# Patient Record
Sex: FEMALE | Race: WHITE | Employment: STUDENT | URBAN - METROPOLITAN AREA
[De-identification: names, ages, dates, MRNs, and addresses within clinical notes are randomized per-mention and may not be internally consistent; named-entity substitution may affect disease eponyms.]

---

## 2024-09-16 ENCOUNTER — HOSPITAL ENCOUNTER (OUTPATIENT)
Age: 34
Discharge: HOME | End: 2024-09-16
Payer: OTHER GOVERNMENT

## 2024-09-16 LAB
ANION GAP SERPL CALCULATED.3IONS-SCNC: 13 MMOL/L (ref 7–16)
BUN SERPL-MCNC: 10 MG/DL (ref 6–20)
CALCIUM SERPL-MCNC: 10.1 MG/DL (ref 8.6–10.2)
CHLORIDE SERPL-SCNC: 102 MMOL/L (ref 98–107)
CHOLEST SERPL-MCNC: 226 MG/DL
CO2 SERPL-SCNC: 24 MMOL/L (ref 22–29)
CREAT SERPL-MCNC: 1 MG/DL (ref 0.5–1)
ESTRADIOL LEVEL: 78.4 PG/ML
FSH SERPL-ACNC: 2.1 MIU/ML
GFR, ESTIMATED: 78 ML/MIN/1.73M2
GLUCOSE SERPL-MCNC: 93 MG/DL (ref 74–99)
HBA1C MFR BLD: 5.9 % (ref 4–5.6)
HDLC SERPL-MCNC: 34 MG/DL
LDLC SERPL CALC-MCNC: 163 MG/DL
LH SERPL-ACNC: 4.7 MIU/ML
POTASSIUM SERPL-SCNC: 4.3 MMOL/L (ref 3.5–5)
PROLACTIN SERPL-MCNC: 8.83 NG/ML
SODIUM SERPL-SCNC: 139 MMOL/L (ref 132–146)
TRIGL SERPL-MCNC: 144 MG/DL
VLDLC SERPL CALC-MCNC: 29 MG/DL

## 2024-09-16 PROCEDURE — 83001 ASSAY OF GONADOTROPIN (FSH): CPT

## 2024-09-16 PROCEDURE — 80061 LIPID PANEL: CPT

## 2024-09-16 PROCEDURE — 82670 ASSAY OF TOTAL ESTRADIOL: CPT

## 2024-09-16 PROCEDURE — 83002 ASSAY OF GONADOTROPIN (LH): CPT

## 2024-09-16 PROCEDURE — 80048 BASIC METABOLIC PNL TOTAL CA: CPT

## 2024-09-16 PROCEDURE — 36415 COLL VENOUS BLD VENIPUNCTURE: CPT

## 2024-09-16 PROCEDURE — 83036 HEMOGLOBIN GLYCOSYLATED A1C: CPT

## 2024-09-16 PROCEDURE — 84146 ASSAY OF PROLACTIN: CPT

## 2025-02-04 ENCOUNTER — OFFICE VISIT (OUTPATIENT)
Dept: FAMILY MEDICINE CLINIC | Age: 35
End: 2025-02-04
Payer: OTHER GOVERNMENT

## 2025-02-04 VITALS
DIASTOLIC BLOOD PRESSURE: 80 MMHG | BODY MASS INDEX: 43.71 KG/M2 | HEIGHT: 64 IN | HEART RATE: 92 BPM | TEMPERATURE: 97.8 F | SYSTOLIC BLOOD PRESSURE: 138 MMHG | OXYGEN SATURATION: 97 % | WEIGHT: 256 LBS

## 2025-02-04 DIAGNOSIS — R73.03 PRE-DIABETES: ICD-10-CM

## 2025-02-04 DIAGNOSIS — E78.5 HYPERLIPIDEMIA, UNSPECIFIED HYPERLIPIDEMIA TYPE: Primary | ICD-10-CM

## 2025-02-04 PROCEDURE — 99204 OFFICE O/P NEW MOD 45 MIN: CPT | Performed by: STUDENT IN AN ORGANIZED HEALTH CARE EDUCATION/TRAINING PROGRAM

## 2025-02-04 RX ORDER — SPIRONOLACTONE 100 MG/1
300 TABLET, FILM COATED ORAL DAILY
COMMUNITY

## 2025-02-04 RX ORDER — ATORVASTATIN CALCIUM 10 MG/1
10 TABLET, FILM COATED ORAL DAILY
Qty: 30 TABLET | Refills: 3 | Status: SHIPPED | OUTPATIENT
Start: 2025-02-04

## 2025-02-04 RX ORDER — ESTRADIOL 1 MG/1
3 TABLET ORAL DAILY
COMMUNITY

## 2025-02-04 SDOH — ECONOMIC STABILITY: FOOD INSECURITY: WITHIN THE PAST 12 MONTHS, THE FOOD YOU BOUGHT JUST DIDN'T LAST AND YOU DIDN'T HAVE MONEY TO GET MORE.: NEVER TRUE

## 2025-02-04 SDOH — ECONOMIC STABILITY: FOOD INSECURITY: WITHIN THE PAST 12 MONTHS, YOU WORRIED THAT YOUR FOOD WOULD RUN OUT BEFORE YOU GOT MONEY TO BUY MORE.: NEVER TRUE

## 2025-02-04 SDOH — HEALTH STABILITY: PHYSICAL HEALTH
ON AVERAGE, HOW MANY DAYS PER WEEK DO YOU ENGAGE IN MODERATE TO STRENUOUS EXERCISE (LIKE A BRISK WALK)?: PATIENT DECLINED

## 2025-02-04 ASSESSMENT — ENCOUNTER SYMPTOMS
NAUSEA: 0
ABDOMINAL PAIN: 0
COUGH: 0
SHORTNESS OF BREATH: 0
RHINORRHEA: 0
VOMITING: 0

## 2025-02-04 ASSESSMENT — PATIENT HEALTH QUESTIONNAIRE - PHQ9
SUM OF ALL RESPONSES TO PHQ9 QUESTIONS 1 & 2: 2
SUM OF ALL RESPONSES TO PHQ QUESTIONS 1-9: 2
1. LITTLE INTEREST OR PLEASURE IN DOING THINGS: SEVERAL DAYS
2. FEELING DOWN, DEPRESSED OR HOPELESS: SEVERAL DAYS
SUM OF ALL RESPONSES TO PHQ QUESTIONS 1-9: 2

## 2025-02-04 NOTE — PROGRESS NOTES
[facility]  Office Progress Note  Dr. Gary Stauffer      Patient:  Traci Dickens 34 y.o. female     Date of Service: 2/4/25      Chief complaint:   Chief Complaint   Patient presents with    Establish Care     Asking for basic labs         History of Present Illness   The patient is a 34 y.o. female  here to follow up of their establish, pre DM and HLD    History of Present Illness        Patient would like to establish with a new PCP  Reports a hx of hld and pre DM  Practitioner rx her hormones recommended establishing  Currently not on any medication for this  Unaware of family hx  Denies chest pain, shortness of breath, leg swelling, headache, vision changes and orthopnea  Recently has been trying to make appropriate dietary changes. Does cardiovascular exercise frequently    BP Readings from Last 3 Encounters:   02/04/25 138/80         Past Medical History:  No past medical history on file.    Review of Systems:   Review of Systems   Constitutional:  Negative for chills and fever.   HENT:  Negative for congestion and rhinorrhea.    Respiratory:  Negative for cough and shortness of breath.    Cardiovascular:  Negative for chest pain and leg swelling.   Gastrointestinal:  Negative for abdominal pain, nausea and vomiting.   Genitourinary:  Negative for dysuria and hematuria.   Musculoskeletal:  Negative for arthralgias and myalgias.   Skin:  Negative for rash and wound.   Neurological:  Negative for dizziness and light-headedness.       Physical Exam   Vitals: /80   Pulse 92   Temp 97.8 °F (36.6 °C)   Ht 1.626 m (5' 4\")   Wt 116.1 kg (256 lb)   SpO2 97%   BMI 43.94 kg/m²   Physical Exam    Physical Exam      General:  Well developed, well nourished, well groomed.  No apparent distress.  HEENT:  Normocephalic, atraumatic.  No scleral icterus.  No conjunctival injection. No nasal discharge.  Heart:  RRR, no murmurs, gallops, or rubs  Lungs:  CTA bilaterally, bilat symmetrical expansion,

## 2025-04-28 DIAGNOSIS — R73.03 PRE-DIABETES: ICD-10-CM

## 2025-04-28 DIAGNOSIS — E78.5 HYPERLIPIDEMIA, UNSPECIFIED HYPERLIPIDEMIA TYPE: ICD-10-CM

## 2025-04-28 LAB
ALBUMIN: 4.2 G/DL (ref 3.5–5.2)
ALP BLD-CCNC: 62 U/L (ref 35–104)
ALT SERPL-CCNC: 16 U/L (ref 0–35)
ANION GAP SERPL CALCULATED.3IONS-SCNC: 9 MMOL/L (ref 7–16)
AST SERPL-CCNC: 20 U/L (ref 0–35)
BASOPHILS ABSOLUTE: 0.05 K/UL (ref 0–0.2)
BASOPHILS RELATIVE PERCENT: 1 % (ref 0–2)
BILIRUB SERPL-MCNC: 0.3 MG/DL (ref 0–1.2)
BUN BLDV-MCNC: 7 MG/DL (ref 6–20)
CALCIUM SERPL-MCNC: 9.3 MG/DL (ref 8.6–10)
CHLORIDE BLD-SCNC: 102 MMOL/L (ref 98–107)
CHOLESTEROL, TOTAL: 190 MG/DL
CO2: 23 MMOL/L (ref 22–29)
CREAT SERPL-MCNC: 0.8 MG/DL (ref 0.5–1)
EOSINOPHILS ABSOLUTE: 0.06 K/UL (ref 0.05–0.5)
EOSINOPHILS RELATIVE PERCENT: 1 % (ref 0–6)
GFR, ESTIMATED: >90 ML/MIN/1.73M2
GLUCOSE BLD-MCNC: 105 MG/DL (ref 74–99)
HBA1C MFR BLD: 5.8 % (ref 4–5.6)
HCT VFR BLD CALC: 42.9 % (ref 34–48)
HDLC SERPL-MCNC: 35 MG/DL
HEMOGLOBIN: 13.7 G/DL (ref 11.5–15.5)
IMMATURE GRANULOCYTES %: 0 % (ref 0–5)
IMMATURE GRANULOCYTES ABSOLUTE: 0.03 K/UL (ref 0–0.58)
LDL CHOLESTEROL: 134 MG/DL
LYMPHOCYTES ABSOLUTE: 1.78 K/UL (ref 1.5–4)
LYMPHOCYTES RELATIVE PERCENT: 22 % (ref 20–42)
MCH RBC QN AUTO: 30.2 PG (ref 26–35)
MCHC RBC AUTO-ENTMCNC: 31.9 G/DL (ref 32–34.5)
MCV RBC AUTO: 94.5 FL (ref 80–99.9)
MONOCYTES ABSOLUTE: 0.68 K/UL (ref 0.1–0.95)
MONOCYTES RELATIVE PERCENT: 8 % (ref 2–12)
NEUTROPHILS ABSOLUTE: 5.54 K/UL (ref 1.8–7.3)
NEUTROPHILS RELATIVE PERCENT: 68 % (ref 43–80)
PDW BLD-RTO: 12.4 % (ref 11.5–15)
PLATELET # BLD: 413 K/UL (ref 130–450)
PMV BLD AUTO: 10.3 FL (ref 7–12)
POTASSIUM SERPL-SCNC: 4.4 MMOL/L (ref 3.5–5.1)
RBC # BLD: 4.54 M/UL (ref 3.5–5.5)
SODIUM BLD-SCNC: 134 MMOL/L (ref 136–145)
TOTAL PROTEIN: 7 G/DL (ref 6.4–8.3)
TRIGL SERPL-MCNC: 108 MG/DL
VLDLC SERPL CALC-MCNC: 22 MG/DL
WBC # BLD: 8.1 K/UL (ref 4.5–11.5)

## 2025-04-29 ENCOUNTER — RESULTS FOLLOW-UP (OUTPATIENT)
Dept: FAMILY MEDICINE CLINIC | Age: 35
End: 2025-04-29

## 2025-05-28 ENCOUNTER — OFFICE VISIT (OUTPATIENT)
Dept: FAMILY MEDICINE CLINIC | Age: 35
End: 2025-05-28
Payer: OTHER GOVERNMENT

## 2025-05-28 VITALS
BODY MASS INDEX: 45.32 KG/M2 | TEMPERATURE: 97.4 F | OXYGEN SATURATION: 99 % | SYSTOLIC BLOOD PRESSURE: 120 MMHG | HEART RATE: 94 BPM | WEIGHT: 264 LBS | DIASTOLIC BLOOD PRESSURE: 72 MMHG

## 2025-05-28 DIAGNOSIS — F41.9 ANXIETY AND DEPRESSION: Primary | ICD-10-CM

## 2025-05-28 DIAGNOSIS — E66.813 CLASS 3 SEVERE OBESITY DUE TO EXCESS CALORIES WITHOUT SERIOUS COMORBIDITY WITH BODY MASS INDEX (BMI) OF 45.0 TO 49.9 IN ADULT (HCC): ICD-10-CM

## 2025-05-28 DIAGNOSIS — R73.03 PRE-DIABETES: ICD-10-CM

## 2025-05-28 DIAGNOSIS — F32.A ANXIETY AND DEPRESSION: Primary | ICD-10-CM

## 2025-05-28 PROCEDURE — 99214 OFFICE O/P EST MOD 30 MIN: CPT | Performed by: STUDENT IN AN ORGANIZED HEALTH CARE EDUCATION/TRAINING PROGRAM

## 2025-05-28 RX ORDER — BUPROPION HYDROCHLORIDE 150 MG/1
150 TABLET ORAL EVERY MORNING
Qty: 30 TABLET | Refills: 1 | Status: SHIPPED | OUTPATIENT
Start: 2025-05-28

## 2025-05-28 ASSESSMENT — ENCOUNTER SYMPTOMS
RHINORRHEA: 0
ABDOMINAL PAIN: 0
NAUSEA: 0
SHORTNESS OF BREATH: 0
VOMITING: 0
COUGH: 0

## 2025-05-28 NOTE — PROGRESS NOTES
[facility]  Office Progress Note  Dr. Gary Stauffer      Patient:  Traci Dicekns 34 y.o. female     Date of Service: 5/28/25      Chief complaint:   Chief Complaint   Patient presents with    Anxiety    Depression    Weight Loss     Has made exercise and diet modification         History of Present Illness   The patient is a 34 y.o. female  here to follow up of their depression, anxiety, pre DM    History of Present Illness     Depression/anxiety  -work increasing anxiety  -not wanting to do anything, hard time getting the motivation to go anywhere other than work  -causign some issues with personal relationships  -has not taken anything in the past  -no SI/HI  -is open to counseling and or medication    Pre DM/obesity  -slight dip in A1c  -interested in weight loss medication  -currently  not on anything for pre DM  -lipids improved on statin  -has been unsuccessful with lifestyle modifications  Wt Readings from Last 3 Encounters:   05/28/25 119.7 kg (264 lb)   02/04/25 116.1 kg (256 lb)     Hemoglobin A1C   Date Value Ref Range Status   04/28/2025 5.8 (H) 4.0 - 5.6 % Final            Past Medical History:  No past medical history on file.    Review of Systems:   Review of Systems   Constitutional:  Negative for chills and fever.   HENT:  Negative for congestion and rhinorrhea.    Respiratory:  Negative for cough and shortness of breath.    Cardiovascular:  Negative for chest pain and leg swelling.   Gastrointestinal:  Negative for abdominal pain, nausea and vomiting.   Genitourinary:  Negative for dysuria and hematuria.   Musculoskeletal:  Negative for arthralgias and myalgias.   Skin:  Negative for rash and wound.   Neurological:  Negative for dizziness and light-headedness.   Psychiatric/Behavioral:  Positive for dysphoric mood and sleep disturbance. Negative for self-injury and suicidal ideas. The patient is nervous/anxious.        Physical Exam   Vitals: /72   Pulse 94   Temp 97.4 °F

## 2025-05-28 NOTE — PROGRESS NOTES
[facility]  Office Progress Note  Dr. Gary Stauffer      Patient:  Traci Dickens 34 y.o. female     Date of Service: 5/28/25      Chief complaint:   Chief Complaint   Patient presents with   • Anxiety   • Depression   • Weight Loss     Has made exercise and diet modification         History of Present Illness   The patient is a 34 y.o. female  here to follow up of their ***    History of Present Illness            Past Medical History:  No past medical history on file.    Review of Systems:   Review of Systems    Physical Exam   Vitals: /72   Pulse 94   Temp 97.4 °F (36.3 °C)   Wt 119.7 kg (264 lb)   SpO2 99%   BMI 45.32 kg/m²   Physical Exam    Physical Exam      General:  Well developed, well nourished, well groomed.  No apparent distress.  HEENT:  Normocephalic, atraumatic.  No scleral icterus.  No conjunctival injection. No nasal discharge.  Heart:  RRR, no murmurs, gallops, or rubs  Lungs:  CTA bilaterally, bilat symmetrical expansion, no wheeze, rales, or rhonchi  Abdomen:  Soft, nontender, no masses, no organomegaly, no peritoneal signs  Extremities:  No clubbing, cyanosis, or edema  Neuro:  Alert and oriented x3, no focal deficits      Assessment and Plan     Assessment & Plan        There are no diagnoses linked to this encounter.     Counseled regarding above diagnosis, including possible risks and complications,  especially if left uncontrolled. Counseled regarding the possible side effects, risks, benefits and alternatives to treatment;patient and/or guardian verbalizes understanding, agrees, feels comfortable with and wishes to proceed with above treatment plan.    Call or go to ED immediately if symptoms worsen or persist. Advised patient to call with any new medication issues, and, as applicable, read all Rx info from pharmacy to assure aware of all possible risks and side effects of medicationbefore taking.     Patient and/or guardian given opportunity to ask

## 2025-06-10 ENCOUNTER — TELEPHONE (OUTPATIENT)
Dept: FAMILY MEDICINE CLINIC | Age: 35
End: 2025-06-10

## 2025-06-10 NOTE — TELEPHONE ENCOUNTER
Traci calling about the Atorvastatin & Bupropion she is taking.       One or both of these meds are causing her to become bloated, and she has no appetite.    She is trying to drink more water and juice, but this is not making much of a difference.

## 2025-06-10 NOTE — TELEPHONE ENCOUNTER
I tried to reach Traci to discuss a possible change in medication from Atorvastatin to Crestor, but she did not answer. The voicemail is full.

## 2025-06-11 NOTE — TELEPHONE ENCOUNTER
I tried to reach Traci with Dr Stauffer's recommendation for the Crestor, but she did not answer, and voicemail is full.      Dr Stauffer's response:        If not improving can try cutting in half or taking every other day. If still having issues after that OK to stop and let me know

## 2025-06-11 NOTE — TELEPHONE ENCOUNTER
If not improving can try cutting in half or taking every other day. If still having issues after that OK to stop and let me know

## 2025-06-11 NOTE — TELEPHONE ENCOUNTER
Traci called back and was advised to cut the Crestor pill in half or take it every other day.      She is agreeable to this and will call back if she has any issues with the Crestor.

## 2025-06-22 DIAGNOSIS — F32.A ANXIETY AND DEPRESSION: ICD-10-CM

## 2025-06-22 DIAGNOSIS — F41.9 ANXIETY AND DEPRESSION: ICD-10-CM

## 2025-06-24 NOTE — TELEPHONE ENCOUNTER
Last Appointment:  5/28/2025  Future Appointments   Date Time Provider Department Center   7/15/2025 12:30 PM Gary Stauffer MD [facility]

## 2025-06-25 RX ORDER — BUPROPION HYDROCHLORIDE 150 MG/1
150 TABLET ORAL EVERY MORNING
Qty: 90 TABLET | Refills: 1 | Status: SHIPPED | OUTPATIENT
Start: 2025-06-25

## 2025-07-15 ENCOUNTER — OFFICE VISIT (OUTPATIENT)
Dept: FAMILY MEDICINE CLINIC | Age: 35
End: 2025-07-15
Payer: OTHER GOVERNMENT

## 2025-07-15 VITALS
SYSTOLIC BLOOD PRESSURE: 118 MMHG | RESPIRATION RATE: 16 BRPM | OXYGEN SATURATION: 98 % | BODY MASS INDEX: 44.8 KG/M2 | TEMPERATURE: 97 F | HEIGHT: 64 IN | DIASTOLIC BLOOD PRESSURE: 80 MMHG | HEART RATE: 79 BPM | WEIGHT: 262.4 LBS

## 2025-07-15 DIAGNOSIS — F41.9 ANXIETY AND DEPRESSION: ICD-10-CM

## 2025-07-15 DIAGNOSIS — E66.813 CLASS 3 SEVERE OBESITY DUE TO EXCESS CALORIES WITHOUT SERIOUS COMORBIDITY WITH BODY MASS INDEX (BMI) OF 45.0 TO 49.9 IN ADULT (HCC): ICD-10-CM

## 2025-07-15 DIAGNOSIS — E78.5 HYPERLIPIDEMIA, UNSPECIFIED HYPERLIPIDEMIA TYPE: ICD-10-CM

## 2025-07-15 DIAGNOSIS — M25.511 RIGHT SHOULDER PAIN, UNSPECIFIED CHRONICITY: ICD-10-CM

## 2025-07-15 DIAGNOSIS — R73.03 PRE-DIABETES: Primary | ICD-10-CM

## 2025-07-15 DIAGNOSIS — F32.A ANXIETY AND DEPRESSION: ICD-10-CM

## 2025-07-15 PROCEDURE — 99214 OFFICE O/P EST MOD 30 MIN: CPT | Performed by: STUDENT IN AN ORGANIZED HEALTH CARE EDUCATION/TRAINING PROGRAM

## 2025-07-15 RX ORDER — MELOXICAM 15 MG/1
15 TABLET ORAL DAILY
Qty: 14 TABLET | Refills: 0 | Status: SHIPPED | OUTPATIENT
Start: 2025-07-15

## 2025-07-15 ASSESSMENT — ENCOUNTER SYMPTOMS
COUGH: 0
SHORTNESS OF BREATH: 0
RHINORRHEA: 0
VOMITING: 0
NAUSEA: 0
ABDOMINAL PAIN: 0

## 2025-07-15 NOTE — PROGRESS NOTES
[facility]  Office Progress Note  Dr. Gary Stauffer      Patient:  Traci Dickens 35 y.o. female     Date of Service: 7/15/25      Chief complaint:   Chief Complaint   Patient presents with    Follow-up     Not started Mounjaro yet, unsure if there is an insurnace problem. Having right shoulder pain after dog jumpbed on it. Has pain with certain movement, pain radiates into neck and down to elbow.No numbness/tingling, no swellling, no weakness.          History of Present Illness   The patient is a 35 y.o. female  here to follow up of their anxiety    History of Present Illness  The patient presents for evaluation of anxiety, shoulder pain, and bloating.    Anxiety  - She reports an improvement in her condition since her last visit in late May or early June 2025.  - She is currently on Wellbutrin  - Has not yet heard from counseling, referral was sent.   - She is considering adjusting the timing of these medications, taking the anxiety medication in the morning and the cholesterol medication at night, to see if it alleviates bloating symptoms.    Outside of bloating symptoms mentioned above, tolerating lipitor well. Denies chest pain, shortness of breath, leg swelling, headache, vision changes and orthopnea. Mounjaro was rx at last visit. Needed prior auth. Have not heard result yet.      Shoulder Pain  - She has been experiencing shoulder pain for approximately a month, which she attributes to an incident involving her dog.  - The pain is localized to the back of her shoulder and occasionally radiates to her neck.  - It does not extend upwards or downwards but is particularly noticeable when she moves her arm in certain ways.  - The pain seems to ease as the day progresses but intensifies at night when she lies in bed with her arm extended.  - She has not taken any over-the-counter medications for this pain.  - She is right-handed and her job involves lifting, which exacerbates the

## 2025-08-24 DIAGNOSIS — E78.5 HYPERLIPIDEMIA, UNSPECIFIED HYPERLIPIDEMIA TYPE: ICD-10-CM

## 2025-08-25 RX ORDER — ATORVASTATIN CALCIUM 10 MG/1
10 TABLET, FILM COATED ORAL DAILY
Qty: 90 TABLET | Refills: 1 | Status: SHIPPED | OUTPATIENT
Start: 2025-08-25